# Patient Record
(demographics unavailable — no encounter records)

---

## 2024-11-14 NOTE — HISTORY OF PRESENT ILLNESS
[Post-hospitalization from ___ Hospital] : Post-hospitalization from [unfilled] Hospital [Admitted on: ___] : The patient was admitted on [unfilled] [Discharged on ___] : discharged on [unfilled] [FreeTextEntry2] : 65 F PMHx IDDM, HTN, CAD s/p CABG, CKD3, AF, hypothyroidism, GERD presents to establish care post [facility] discharge. Admitted after being unresponsive right after dinner with daughter. Hospital course complicated by hypoglycemia down to 40 within the first 24 hours of admission. Notably she was on GLP-1 and 55 units of longacting insulin with no prandial insulin. She was injecting in the same site chronically as well. This regimen was prescribed by her prior PCP with whom she did not have a strong relationship. vEEG negative, renal function improved, discharged with Dx of non-convulsive seizure. Thought to possibly by psychogenic in nature so started on SNRI for mood stabilization. No insulin since discharge. Last night 260 2-3 hours after dinner. Uses trulicity 0.75 once a week. Stopped metformin previously but willing to try it again. Gets some GI distress. b/l AKBAR chronic. Cardiologist -- [facility]

## 2024-11-14 NOTE — REVIEW OF SYSTEMS
[Chest Pain] : no chest pain [Palpitations] : no palpitations [Lower Ext Edema] : lower extremity edema [Negative] : Psychiatric

## 2024-11-14 NOTE — PHYSICAL EXAM
[No Acute Distress] : no acute distress [Normal Sclera/Conjunctiva] : normal sclera/conjunctiva [Normal Outer Ear/Nose] : the outer ears and nose were normal in appearance [No Respiratory Distress] : no respiratory distress  [Coordination Grossly Intact] : coordination grossly intact [Normal Gait] : normal gait [Normal Affect] : the affect was normal [de-identified] : 1+ b/l edema to shins

## 2024-11-14 NOTE — PHYSICAL EXAM
[No Acute Distress] : no acute distress [Normal Sclera/Conjunctiva] : normal sclera/conjunctiva [Normal Outer Ear/Nose] : the outer ears and nose were normal in appearance [No Respiratory Distress] : no respiratory distress  [Coordination Grossly Intact] : coordination grossly intact [Normal Gait] : normal gait [Normal Affect] : the affect was normal [de-identified] : 1+ b/l edema to shins

## 2024-11-14 NOTE — HISTORY OF PRESENT ILLNESS
[Post-hospitalization from ___ Hospital] : Post-hospitalization from [unfilled] Hospital [Admitted on: ___] : The patient was admitted on [unfilled] [Discharged on ___] : discharged on [unfilled] [FreeTextEntry2] : 65 F PMHx IDDM, HTN, CAD s/p CABG, CKD3, AF, hypothyroidism, GERD presents to establish care post [facility] discharge. Admitted after being unresponsive right after dinner with daughter. Hospital course complicated by hypoglycemia down to 40 within the first 24 hours of admission. Notably she was on GLP-1 and 55 units of longacting insulin with no prandial insulin. She was injecting in the same site chronically as well. This regimen was prescribed by her prior PCP with whom she did not have a strong relationship. vEEG negative, renal function improved, discharged with Dx of non-convulsive seizure. Thought to possibly by psychogenic in nature so started on SNRI for mood stabilization. No insulin since discharge. Last night 260 2-3 hours after dinner. Uses trulicity 0.75 once a week. Stopped metformin previously but willing to try it again. Gets some GI distress. b/l AKBAR chronic. Cardiologist -- [facility]

## 2024-12-02 NOTE — REVIEW OF SYSTEMS
[Nocturia] : nocturia [Frequency] : frequency [Dizziness] : dizziness [Unsteady Walking] : ataxia [Insomnia] : insomnia [Anxiety] : anxiety [Negative] : Cardiovascular [Fainting] : no fainting [Confusion] : no confusion

## 2024-12-02 NOTE — PHYSICAL EXAM
[No Acute Distress] : no acute distress [No Respiratory Distress] : no respiratory distress  [Normal Rate] : normal rate  [de-identified] : EOMI intact with no saccade. Able to stand with feet together and eyes closed with some swaying but self corrects. Declined single leg stand.

## 2024-12-02 NOTE — HISTORY OF PRESENT ILLNESS
[FreeTextEntry1] : DM follow-up [de-identified] : Arrived 25 minutes late so limited visit done. Dizzy episodes frequently the time with no identifiable triggers. At first she said she is dizzy all the time, then clarified to episodes of dizziness that happen multiple times per day. She feels stressed when she has them but isn't sure if they are triggered themselves by stress. Responds yes to lightheadedness, disequilibrium, and vertigo. Last happened this morning. Lasts about 5-10 minutes and goes away when she lies down. BS has been 100s to 120s. Right now is 156 and she says she's getting another attack. Associated with blurry vision as well. BP at home today 96/42 and she felt dizzy. Last night 96/58. 115/65 here. Drinking 1 gallon per day of water. Still having b/l AKBAR Will reduce amlodipine 10 to 5 then see in 2 weeks. If still dizzy will DC HCTZ vs increase venlafaxine. Sees endo tomorrow -- would be fine with DCing HCTZ and switch to SGLT2 if needed. Sees outside cardiologist on 12/10 Urinary frequency with 5 episodes of nocturia.

## 2024-12-05 NOTE — PHYSICAL EXAM
[Alert] : alert [No Acute Distress] : no acute distress [Normal Sclera/Conjunctiva] : normal sclera/conjunctiva [Normal Hearing] : hearing was normal [No Respiratory Distress] : no respiratory distress [No Accessory Muscle Use] : no accessory muscle use [Normal Rate and Effort] : normal respiratory rate and effort [Clear to Auscultation] : lungs were clear to auscultation bilaterally [Normal S1, S2] : normal S1 and S2 [Normal Rate] : heart rate was normal [Pedal Pulses Normal] : the pedal pulses are present [Normal Bowel Sounds] : normal bowel sounds [Not Tender] : non-tender [Not Distended] : not distended [Soft] : abdomen soft [Spine Straight] : spine straight [No Stigmata of Cushings Syndrome] : no stigmata of Cushings Syndrome [Right foot was examined, including] : right foot ~C was examined, including visual inspection with sensory and pulse exams [Left foot was examined, including] : left foot ~C was examined, including visual inspection with sensory and pulse exams [Normal] : normal [2+] : 2+ in the dorsalis pedis [Oriented x3] : oriented to person, place, and time [Normal Insight/Judgement] : insight and judgment were intact [Kyphosis] : no kyphosis present [Foot Ulcers] : no foot ulcers [de-identified] : Isthmus of thyroid feels enlarged on palpation [de-identified] : Mild LLE edema (L slightly >R at ankle) [de-identified] : Slowed gait, ambulates with walker [FreeTextEntry4] : monofilament testing intact [FreeTextEntry5] : Mild discoloration to Left foot [FreeTextEntry8] : decreased monofilament testing sensation to left foot

## 2024-12-05 NOTE — DATA REVIEWED
[FreeTextEntry1] : 11/14/24 serum creatinine 1.37, BUN 25, GFR 43 AST 58, ALT 90, Alk Phos 92 ACR 40  11/9/24 c-peptide 2.9 with glucose 107 Proinsulin 16.1 (0-10) serum creatinine 1.6, GFR 36  11/6/24 TSH 5.7, FT4 1.7, TT3 53, TT4 11.48 A1c 6.9%

## 2024-12-05 NOTE — ASSESSMENT
[FreeTextEntry1] : Type 2 DM, well controlled, complications including CKD, CAD  Current regimen:  Metformin ER 500mg daily, Trulicity 0.75mg weekly  Recently hospitalized for non-convulsive seizure and had episode of severe hypoglycemia while hospitalized (was taking Trulicity 0.75mg weekly and Long acting insulin 55 units daily prior to admission). Since stopping insulin after discharge, no hypoglycemia. She is tolerating Trulicity well. Does not like Metformin d/t size of tablets.  C-peptide 2.9 w/glucose 107 and elevated pro-insulin, likely d/t CKD -- will check fructosamine CGM report reveals generally very well controlled DM, FBS at goal of , PPBS w/minimal excursions. No hypos. Would like to keep her on GLP d/t history of CAD/CABG and she is tolerating well. Would not see great benefit on glucose of SGLT2 given recent GFR 43 but could have benefits for CKD.    Goal A1c <7%, A1c 6.8% Goal BP <130/80, BP today 107/67, takes olmesartan-amlodipine-HCTZ daily Goal LDL <100mg/dL, recent LDL 83, on rosuvastatin 40mg daily (above goal of <55 for pt's with CAD)  Plan -- Labs today, will call w/results -- Increase Trulicity to 1.5mg weekly (after finishing next 2 doses of Trulicity 0.75mg) -- Continue Metformin for next 2 weeks then stop when increasing dose of Trulicity -- Reviewed hypoglycemia protocol -- rule of 15. Will prescribe glucose tablets and glucagon -- Continue CGM use w/dexcom G7, indicated for severe hypoglycemia -- Reviewed and encouraged lifestyle modifications for good DM care including aiming for 150 min moderate exercise weekly and ADA healthy eating tips. -- encouraged to get out to senior [place] to promote activity and mental health/wellness -- Continue to follow up with podiatrist, ophthalmologist. -- Follow up with MD in 3 months  2. Hypothyroidism, diagnosed in 2024 Currently taking Levothyroxine 25mcg daily Has enlarged thyroid on exam (notably isthmus)  Plan -- TFTs today -- Thyroid US ordered -- Will call w/results -- Follow up with MD in 3 months

## 2024-12-05 NOTE — REVIEW OF SYSTEMS
[Fatigue] : fatigue [SOB on Exertion] : shortness of breath on exertion [Nocturia] : nocturia [Incontinence] : incontinence [As Noted in HPI] : as noted in HPI [Polydipsia] : polydipsia [Negative] : Integumentary [FreeTextEntry9] : left ankle pain [de-identified] : Intermittently dizzy, resolves with drinking water/laying down

## 2024-12-05 NOTE — ASSESSMENT
[FreeTextEntry1] : Type 2 DM, well controlled, complications including CKD, CAD  Current regimen:  Metformin ER 500mg daily, Trulicity 0.75mg weekly  Recently hospitalized for non-convulsive seizure and had episode of severe hypoglycemia while hospitalized (was taking Trulicity 0.75mg weekly and Long acting insulin 55 units daily prior to admission). Since stopping insulin after discharge, no hypoglycemia. She is tolerating Trulicity well. Does not like Metformin d/t size of tablets.  C-peptide 2.9 w/glucose 107 and elevated pro-insulin, likely d/t CKD -- will check fructosamine CGM report reveals generally very well controlled DM, FBS at goal of , PPBS w/minimal excursions. No hypos. Would like to keep her on GLP d/t history of CAD/CABG and she is tolerating well. Would not see great benefit on glucose of SGLT2 given recent GFR 43 but could have benefits for CKD.    Goal A1c <7%, A1c 6.8% Goal BP <130/80, BP today 107/67, takes olmesartan-amlodipine-HCTZ daily Goal LDL <100mg/dL, recent LDL 83, on rosuvastatin 40mg daily (above goal of <55 for pt's with CAD)  Plan -- Labs today, will call w/results -- Increase Trulicity to 1.5mg weekly (after finishing next 2 doses of Trulicity 0.75mg) -- Continue Metformin for next 2 weeks then stop when increasing dose of Trulicity -- Reviewed hypoglycemia protocol -- rule of 15. Will prescribe glucose tablets and glucagon -- Continue CGM use w/dexcom G7, indicated for severe hypoglycemia -- Reviewed and encouraged lifestyle modifications for good DM care including aiming for 150 min moderate exercise weekly and ADA healthy eating tips. -- encouraged to get out to senior [place] to promote activity and mental health/wellness -- Continue to follow up with podiatrist, ophthalmologist. -- Follow up with MD in 3 months  2. Hypothyroidism, diagnosed in 2024 Currently taking Levothyroxine 25mcg daily Has enlarged thyroid on exam (notably isthmus)  Plan -- TFTs today -- Thyroid US ordered -- Will call w/results -- Follow up with MD in 3 months

## 2024-12-05 NOTE — ADDENDUM
[FreeTextEntry1] : 12/5/24: Called Vonnie to discuss results of labs from 12/3/24. I had tried calling yesterday but she told me she was hospitalized with vomiting/dizziness yesterday and discharged late last night (was told this was an anxiety attack).  TSH 5.68, FT4 1.7, antibodies not significant. She is taking levothyroxine 25mcg on empty stomach, with only water, every day. Discussed confirmed she has no outstanding plans for cardiac procedures since CABG completed in Feb 2024, so will increase to 50mcg daily and repeat blood tests in 6-8 weeks (though advised to call office sooner than that based on how she feels with dose increase).  LDL 82, a bit above goal of <55 for patients with CAD (this may improve with improved thyroid function?), advised to continue statin. Fructosamine consistent with A1c 6.6%, similar to GMI on CGM. Will continue with DM plan as above.  Has follow up appt with Dr. Evans scheduled for 3/4/25

## 2024-12-05 NOTE — HISTORY OF PRESENT ILLNESS
[FreeTextEntry1] : Vonnie Espitia is a 65 year old female who presents to establish care for Diabetes Mellitus Type 2. Referred by:   PMHx: CAD (multiple vessel), atrial fibrillation, anemia, CKD, GERD, HTN, hypothyroidism PSHx: CABG x4 (2/20/24), PCI w/stent (2015), pacemaker placement (2/20/24) FMHx: DM (sisters, mother), CAD (sister) Allergies: Penicillin (rash)   Recently hospitalized at [facility] from 11/7/24 - 11/13/24 for vomiting/unresponsiveness after dinner with daughter (says she ate chicken and has GI intolerance to meat) Of note, had episode of hypoglycemia down to 40 within the first 24 hours of admission (was on GLP-1 and 55 units of longacting insulin at home) Diagnosed with non-convulsive seizure and discharged after renal function improved (started on venlafaxine as seizure thought to be psychogenic in nature) Has not taken insulin since discharge, denies hypoglycemia since stopping insulin  Feeling pretty sad/overwhelmed recently -- Her kids live in [place] so she doesn't see them often, spends most of her days alone, brother's anniversary pf death is coming up on 12/9, 2 best friends passed away recently, also has financial difficulties Uses OTC plus money from [employer] to buy food (next deposit is in January so she's running low on food/eating small portions lately). Does not skip medications to conserve money Feels thirsty often -- wakes up 5x overnight to void Has some dizziness and fatigue   Diagnosed with Diabetes at age 35 (1994) -- she had GDM with pregnancy which persisted. Has had 1 hospitalization for DM at age 50, (fainted and BG was 600)   POCT A1c in office: 6.8%  FS in office: 113 -- 2 boiled eggs and saltines and apple tea and a little cranberry juice at 8AM   Current Regimen: -- Metformin ER 500mg daily (some GI distress w/this but restarted after recent hospitalization -- tolerating better now but does not like size of pills) -- Trulicity 0.75mg weekly -- denies N/V   Previous regimens:  -- Long acting insulin 55 units daily  Vonnie checks BG with Dexcom G7 CGM Date of report download: 11/20/24 - 12/3/24 % time with CGM in use: 93%   Time high: 7%(time BG >251: 1%) Time in range: 93% Time low: 0% (time BG <54: 0%)   GMI: 6.3% Average glucose: 126mg/dL Glucose variability: 34mg/dL   CGM analysis reveals: Generally very well controlled glucose. FBS is at goal of , postprandial excursions generally minimal/short. No hypoglycemia   - Diet: tries to eat 3 meals/day Sleeps in daytime, wakes up around 1-2pm daily Breakfast is 3 eggs, toasted bread Lunch is leftovers (catfish with vegetables) Dinner depends on what she has around -- maybe eggs or vegetables or salmon - Exercise: Previously liked dancing, little exercise since CABG   Complications + neuropathy, CKD / albuminuria, CAD s/p CABG Denies retinopathy   Ophthalmologist: glaucoma B/L -- planning for operation when she is ready, last saw in October 2024 Podiatrist: January 2024   Hypothyroidism, diagnosed while hospitalized for CABG procedure in February 2024 Currently taking Levothyroxine 25mcg daily Last TFTs 11/6/24: TSH 5.7, FT4 1.7, TT3 53  Social Hx: Social alcohol use Denies nicotine, drug use Retired -- worked at [employer] for 35 yrs Lives alone Has 3 children, 2 daughters live in [place], son lives in [place]   Current medications: Metformin ER 500mg daily, Trulicity 0.75mg weekly, Eliquis 5mg BID, levothyroxine 25mcg daily, olmesartan-amlodipine-HCTZ 40-5-12.5mg daily, pantoprazole 40mg daily, rosuvastatin 40mg daily, venlafaxine 37.5mg daily, carvedilol 3.125mg BID

## 2024-12-05 NOTE — HISTORY OF PRESENT ILLNESS
[FreeTextEntry1] : Vonnie Espitia is a 65 year old female who presents to establish care for Diabetes Mellitus Type 2. Referred by:   PMHx: CAD (multiple vessel), atrial fibrillation, anemia, CKD, GERD, HTN, hypothyroidism PSHx: CABG x4 (2/20/24), PCI w/stent (2015), pacemaker placement (2/20/24) FMHx: DM (sisters, mother), CAD (sister) Allergies: Penicillin (rash)   Recently hospitalized at [facility] from 11/7/24 - 11/13/24 for vomiting/unresponsiveness after dinner with daughter (says she ate chicken and has GI intolerance to meat) Of note, had episode of hypoglycemia down to 40 within the first 24 hours of admission (was on GLP-1 and 55 units of longacting insulin at home) Diagnosed with non-convulsive seizure and discharged after renal function improved (started on venlafaxine as seizure thought to be psychogenic in nature) Has not taken insulin since discharge, denies hypoglycemia since stopping insulin  Feeling pretty sad/overwhelmed recently -- Her kids live in [place] so she doesn't see them often, spends most of her days alone, brother's anniversary pf death is coming up on 12/9, 2 best friends passed away recently, also has financial difficulties Uses OTC plus money from [employer] to buy food (next deposit is in January so she's running low on food/eating small portions lately). Does not skip medications to conserve money Feels thirsty often -- wakes up 5x overnight to void Has some dizziness and fatigue   Diagnosed with Diabetes at age 35 (1994) -- she had GDM with pregnancy which persisted. Has had 1 hospitalization for DM at age 50, (fainted and BG was 600)   POCT A1c in office: 6.8%  FS in office: 113 -- 2 boiled eggs and saltines and apple tea and a little cranberry juice at 8AM   Current Regimen: -- Metformin ER 500mg daily (some GI distress w/this but restarted after recent hospitalization -- tolerating better now but does not like size of pills) -- Trulicity 0.75mg weekly -- denies N/V   Previous regimens:  -- Long acting insulin 55 units daily  Vonnie checks BG with Dexcom G7 CGM Date of report download: 11/20/24 - 12/3/24 % time with CGM in use: 93%   Time high: 7%(time BG >251: 1%) Time in range: 93% Time low: 0% (time BG <54: 0%)   GMI: 6.3% Average glucose: 126mg/dL Glucose variability: 34mg/dL   CGM analysis reveals: Generally very well controlled glucose. FBS is at goal of , postprandial excursions generally minimal/short. No hypoglycemia   - Diet: tries to eat 3 meals/day Sleeps in daytime, wakes up around 1-2pm daily Breakfast is 3 eggs, toasted bread Lunch is leftovers (catfish with vegetables) Dinner depends on what she has around -- maybe eggs or vegetables or salmon - Exercise: Previously liked dancing, little exercise since CABG   Complications + neuropathy, CKD / albuminuria, CAD s/p CABG Denies retinopathy   Ophthalmologist: glaucoma B/L -- planning for operation when she is ready, last saw in October 2024 Podiatrist: January 2024   Hypothyroidism, diagnosed while hospitalized for CABG procedure in February 2024 Currently taking Levothyroxine 25mcg daily Last TFTs 11/6/24: TSH 5.7, FT4 1.7, TT3 53  Social Hx: Social alcohol use Denies nicotine, drug use Retired -- worked at [employer] for 35 yrs Lives alone Has 3 children, 2 daughters live in [place], son lives in [place]   Current medications: Metformin ER 500mg daily, Trulicity 0.75mg weekly, Eliquis 5mg BID, levothyroxine 25mcg daily, olmesartan-amlodipine-HCTZ 40-5-12.5mg daily, pantoprazole 40mg daily, rosuvastatin 40mg daily, venlafaxine 37.5mg daily, carvedilol 3.125mg BID

## 2024-12-05 NOTE — REVIEW OF SYSTEMS
[Fatigue] : fatigue [SOB on Exertion] : shortness of breath on exertion [Nocturia] : nocturia [Incontinence] : incontinence [As Noted in HPI] : as noted in HPI [Polydipsia] : polydipsia [Negative] : Integumentary [FreeTextEntry9] : left ankle pain [de-identified] : Intermittently dizzy, resolves with drinking water/laying down

## 2024-12-05 NOTE — PHYSICAL EXAM
[Alert] : alert [No Acute Distress] : no acute distress [Normal Sclera/Conjunctiva] : normal sclera/conjunctiva [Normal Hearing] : hearing was normal [No Respiratory Distress] : no respiratory distress [No Accessory Muscle Use] : no accessory muscle use [Normal Rate and Effort] : normal respiratory rate and effort [Clear to Auscultation] : lungs were clear to auscultation bilaterally [Normal S1, S2] : normal S1 and S2 [Normal Rate] : heart rate was normal [Pedal Pulses Normal] : the pedal pulses are present [Normal Bowel Sounds] : normal bowel sounds [Not Tender] : non-tender [Not Distended] : not distended [Soft] : abdomen soft [Spine Straight] : spine straight [No Stigmata of Cushings Syndrome] : no stigmata of Cushings Syndrome [Right foot was examined, including] : right foot ~C was examined, including visual inspection with sensory and pulse exams [Left foot was examined, including] : left foot ~C was examined, including visual inspection with sensory and pulse exams [Normal] : normal [2+] : 2+ in the dorsalis pedis [Oriented x3] : oriented to person, place, and time [Normal Insight/Judgement] : insight and judgment were intact [Kyphosis] : no kyphosis present [Foot Ulcers] : no foot ulcers [de-identified] : Isthmus of thyroid feels enlarged on palpation [de-identified] : Mild LLE edema (L slightly >R at ankle) [de-identified] : Slowed gait, ambulates with walker [FreeTextEntry4] : monofilament testing intact [FreeTextEntry5] : Mild discoloration to Left foot [FreeTextEntry8] : decreased monofilament testing sensation to left foot

## 2024-12-18 NOTE — HISTORY OF PRESENT ILLNESS
[FreeTextEntry1] : Vonnie is a 65 year old female with CAD s/p CABG (2/2024), hypothyroid, GERD, HTN, DM, Afib on Eliquis s/p PPM, TIA, and CKD presenting for a hospital follow up for seizures.   Presented to  ED on 11/7 for AMS. Stayed up late on 11/6 watching election coverage, and slept until 2PM the next day. When pt woke up, she was in her normal state of health. Later that night, went to a restaurant with her daughter. After eating, daughter stepped out of the restaurant to get the car. Pt then called her daughter complaining of rapid breathing, dizziness, nausea, and feeling like she was going to die. When daughter returned to the restaurant, pt was on a bench outside lying down. Bystanders reported pt had vomited.   Pt also reported 2 other events over the past 2 weeks waking up on the floor in her apartment completely unaware. Of note, also having difficulty with 3 recent deaths, including one of a very close friend two weeks prior.   In the ED, CTH, CTA, and CT perfusion all negative for stroke or structural abnormality. Found to be hypoglycemic and moderately anemic. vEEG showed benign variants (wicket spikes), but no epileptiform discharges. Determined pt had a provoked seizure triggered by emesis vs prodromal emesis with LOC and over 4 hours of post-ictal unawareness.   Started on Effexor 37.5mg daily.

## 2025-01-10 NOTE — PHYSICAL EXAM
[No Acute Distress] : no acute distress [Normal Sclera/Conjunctiva] : normal sclera/conjunctiva [Normal Outer Ear/Nose] : the outer ears and nose were normal in appearance [No JVD] : no jugular venous distention [No Respiratory Distress] : no respiratory distress  [Normal Affect] : the affect was normal [de-identified] : b/l prominent varicose veins. Borderline pitting b/l ankles. 1+ DP bilaterally. [de-identified] : unsteady gait, ambulates with hand on wall

## 2025-01-10 NOTE — REVIEW OF SYSTEMS
[Recent Change In Weight] : ~T no recent weight change [Chest Pain] : chest pain [Lower Ext Edema] : lower extremity edema [Joint Stiffness] : joint stiffness [Joint Swelling] : joint swelling [Headache] : headache

## 2025-01-10 NOTE — HISTORY OF PRESENT ILLNESS
[FreeTextEntry1] : General f/u [de-identified] : 65 F PMHx a fib, CAD, CKD, DM, painful AKBAR, GERD here for general f/u. Cardiologist started ranolazine 500 bid for chest pain which is helping somewhat. Still on amlodipine 5mg. Having worsening leg pain related to varicose veins most bothersome at an area on medial R leg where she had prior surgery many years ago. Taking Tylenol and drinking water but not helping. Tried lidocaine and other creams which also didn't help. Getting headaches possibly related to Tylenol overuse. Walker broke and needs new one. Having throat pain/discomfort and doesn't like the way her pacemaker feels. Managed at [facility] and pending f/u with her EP.

## 2025-02-19 NOTE — REVIEW OF SYSTEMS
[As Noted in HPI] : as noted in HPI [Limb Pain] : limb pain [Limb Swelling] : limb swelling [Negative] : Heme/Lymph [FreeTextEntry1] : as above

## 2025-02-19 NOTE — DATA REVIEWED
[FreeTextEntry1] : i reviewed b/l vle with rvt - b/l gsv and b/l lsv reflux, large branch varicose veins, no dvt, no svt

## 2025-02-19 NOTE — REASON FOR VISIT
[Initial Evaluation] : an initial evaluation [FreeTextEntry1] : pt with severe long standing CEAP 5 CVI with past hx open ulcers left medial ankle (CEAP 6) - has had procedures in the past elsewhere - referred now by Dr Murphy for assessment of severe b/l leg CVI with large varicose veins, severe medial ankle dermatitis (left > right) and sx of ache heaviness cramps swelling fatigue - b/l vle with rvt shows b/l gsv and b/l lsv reflux, no dvt, no svt - pt give Rx for thigh high support hose 20-30 mmHg and will return for b/l gsv and b/l lsv rfa and will need stab phlebectomy. rx given for thigh high support hose 20-30 mmHg.

## 2025-02-19 NOTE — HISTORY OF PRESENT ILLNESS
[FreeTextEntry1] : Pt has + screening on mental health questionnaire and will call to discuss with Dr Murphy re treatment - also provided her with [facility].

## 2025-02-19 NOTE — PHYSICAL EXAM
[2+] : left 2+ [Ankle Swelling (On Exam)] : present [Ankle Swelling On The Left] : moderate [Varicose Veins Of Lower Extremities] : bilaterally [] : bilaterally [Ankle Swelling Bilaterally] : severe [Purpura] : no purpura  [Petechiae] : no petechiae [Skin Ulcer] : no ulcer [Skin Induration] : induration [Alert] : alert [Oriented to Person] : oriented to person [Oriented to Place] : oriented to place [Oriented to Time] : oriented to time [Depressed] : depressed [de-identified] : wdwn nad [FreeTextEntry1] : large branch varicose veins (l>rt), indurated painful dermatitis left medial ankle > rt [de-identified] : wnl [de-identified] : as above

## 2025-02-19 NOTE — ASSESSMENT
[FreeTextEntry1] : Vascular surgeon discussed with the patient: Varicose veins are enlarged, twisted veins. Varicose veins are caused by increased blood pressure in the veins.  The blood moves towards the heart by 1-way valves in the veins. When the valves become weakened or damaged, blood can collect in the veins. This causes the veins to become enlarged. Sitting or standing for long periods can cause blood to pool in the leg veins, increasing the pressure within the veins. The veins can stretch from the increased pressure. This may weaken the walls of the veins and damage the valves. Varicose veins may be more common in some families (inherited).  Factors that may increase pressure include:  obesity, older age, being female, pregnancy, taking oral contraceptive pills or hormone replacement, being inactive, and/or smoking.  The most common symptoms of varicose veins are sensations in the legs, such as a heavy feeling, burning, and/or aching. However, each individual may experience symptoms differently.  Other symptoms may include:  color changes in the skin, sores on the legs, or rash.  Severe varicose veins may eventually produce long-term mild swelling that can result in more serious skin and tissue problems, such as ulcers and non-healing sores. Varicose veins are diagnosed by a complete medical history, physical examination, and diagnostic studies for varicose veins including duplex ultrasound and color-flow imaging.   Medical treatment for varicose veins include:  compression stockings, sclerotherapy, RFA,endovenous laser ablation and/or surgical treatment microphlebectomy.    Pt with severe b/l CVI  Treatment: b/l gsv and b/l lsv rfa stab phlebectomy Rx for thigh high support hose 20-30 mmHg [Other: _____] : [unfilled]

## 2025-03-04 NOTE — PHYSICAL EXAM
[Alert] : alert [Normal Sclera/Conjunctiva] : normal sclera/conjunctiva [No Neck Mass] : no neck mass was observed [Clear to Auscultation] : lungs were clear to auscultation bilaterally [Normal Rate] : heart rate was normal [Carotids Normal] : carotid pulses were normal with no bruits [Soft] : abdomen soft [No CVA Tenderness] : no ~M costovertebral angle tenderness [No Stigmata of Cushings Syndrome] : no stigmata of Cushings Syndrome [Normal Gait] : normal gait [No Rash] : no rash [Normal Reflexes] : deep tendon reflexes were 2+ and symmetric [Oriented x3] : oriented to person, place, and time

## 2025-03-04 NOTE — ASSESSMENT
[Carbohydrate Consistent Diet] : carbohydrate consistent diet [Importance of Diet and Exercise] : importance of diet and exercise to improve glycemic control, achieve weight loss and improve cardiovascular health [Diabetic Medications] : Risks and benefits of diabetic medications were discussed [FreeTextEntry1] : Type 2 DM, well controlled, complications including CKD, CAD  Current regimen:  Metformin ER 500mg daily, Trulicity 0.75mg weekly  Recently hospitalized for non-convulsive seizure and had episode of severe hypoglycemia while hospitalized (was taking Trulicity 0.75mg weekly and Long acting insulin 55 units daily prior to admission). Since stopping insulin after discharge, no hypoglycemia. She is tolerating Trulicity well. Does not like Metformin d/t size of tablets.  C-peptide 2.9 w/glucose 107 and elevated pro-insulin, likely d/t CKD -- will check fructosamine CGM report reveals generally very well controlled DM, FBS at goal of , PPBS w/minimal excursions. No hypos. Would like to keep her on GLP d/t history of CAD/CABG and she is tolerating well. Would not see great benefit on glucose of SGLT2 given recent GFR 43 but could have benefits for CKD.    Goal A1c <7%, A1c 6.8% Goal BP <130/80, BP today 107/67, takes olmesartan-amlodipine-HCTZ daily Goal LDL <100mg/dL, recent LDL 83, on rosuvastatin 40mg daily (above goal of <55 for pt's with CAD)  Plan -- Labs today, will call w/results -- Increase Trulicity to 1.5mg weekly (after finishing next 2 doses of Trulicity 0.75mg) -- Continue Metformin for next 2 weeks then stop when increasing dose of Trulicity -- Reviewed hypoglycemia protocol -- rule of 15. Will prescribe glucose tablets and glucagon -- Continue CGM use w/dexcom G7, indicated for severe hypoglycemia -- Reviewed and encouraged lifestyle modifications for good DM care including aiming for 150 min moderate exercise weekly and ADA healthy eating tips. -- encouraged to get out to senior [place] to promote activity and mental health/wellness -- Continue to follow up with podiatrist, ophthalmologist. -- Follow up with MD in 3 months  2. Hypothyroidism, diagnosed in 2024 Currently taking Levothyroxine 25mcg daily Has enlarged thyroid on exam (notably isthmus)  Plan -- TFTs today -- Thyroid US ordered -- Will call w/results -- Follow up with MD in 3 months

## 2025-03-04 NOTE — HISTORY OF PRESENT ILLNESS
[FreeTextEntry1] : Vonnie Espitia is a 65 year old female who presents to establish care for Diabetes Mellitus Type 2 diagnosed approximately at [facility] [address].  PMHx: CAD (multiple vessel), atrial fibrillation, anemia, CKD, GERD, HTN, hypothyroidism PSHx: CABG x4 (2/20/24), PCI w/stent (2015), pacemaker placement (2/20/24) FMHx: DM (sisters, mother), CAD (sister) Allergies: Penicillin (rash)   Recently hospitalized at [facility] from 11/7/24 - 11/13/24 for vomiting/unresponsiveness after dinner with daughter (says she ate chicken and has GI intolerance to meat) Of note, had episode of hypoglycemia down to 40 within the first 24 hours of admission (was on GLP-1 and 55 units of longacting insulin at home) Diagnosed with non-convulsive seizure and discharged after renal function improved (started on venlafaxine as seizure thought to be psychogenic in nature) Has not taken insulin since discharge, denies hypoglycemia since stopping insulin  Feeling pretty sad/overwhelmed recently -- Her kids live in [place] so she doesn't see them often, spends most of her days alone, brother's anniversary pf death is coming up on 12/9, 2 best friends passed away recently, also has financial difficulties Uses OTC plus money from [email or web address] to buy food (next deposit is in January so she's running low on food/eating small portions lately). Does not skip medications to conserve money Feels thirsty often -- wakes up 5x overnight to void Has some dizziness and fatigue  Diagnosed with Diabetes at age 35 (1994) -- she had GDM with pregnancy which persisted. Has had 1 hospitalization for DM at age 50, (fainted and BG was 600)  POCT A1c in office: 6.8%  FS in office: 113 -- 2 boiled eggs and saltines and apple tea and a little cranberry juice at 8AM  3/4/25, patient is feeling well no major complaints, she's a schedule for incompetent varicocity surgery. Glucose readings at home are in the 130 - 140 range. No osmotic diuresis symptoms Medicines: metformin 500 mg once today, trulicity  1.5 weekly injection, rosuvastatin 40 mg Eliquis 5 mg, and levothyroxin 25 g daily December 3, 2024, and A1c 6.7%, TSH 5.6    Current Regimen: -- Metformin ER 500mg daily (some GI distress w/this but restarted after recent hospitalization -- tolerating better now but does not like size of pills) -- Trulicity 0.75mg weekly -- denies N/V   Previous regimens:  -- Long acting insulin 55 units daily  Vonnie checks BG with Dexcom G7 CGM Date of report download: 11/20/24 - 12/3/24 % time with CGM in use: 93%   Time high: 7%(time BG >251: 1%) Time in range: 93% Time low: 0% (time BG <54: 0%)   GMI: 6.3% Average glucose: 126mg/dL Glucose variability: 34mg/dL   CGM analysis reveals: Generally very well controlled glucose. FBS is at goal of , postprandial excursions generally minimal/short. No hypoglycemia   - Diet: tries to eat 3 meals/day Sleeps in daytime, wakes up around 1-2pm daily Breakfast is 3 eggs, toasted bread Lunch is leftovers (catfish with vegetables) Dinner depends on what she has around -- maybe eggs or vegetables or salmon - Exercise: Previously liked dancing, little exercise since CABG   Complications + neuropathy, CKD / albuminuria, CAD s/p CABG Denies retinopathy   Ophthalmologist: glaucoma B/L -- planning for operation when she is ready, last saw in October 2024 Podiatrist: January 2024   Hypothyroidism, diagnosed while hospitalized for CABG procedure in February 2024 Currently taking Levothyroxine 25mcg daily Last TFTs 11/6/24: TSH 5.7, FT4 1.7, TT3 53  Social Hx: Social alcohol use Denies nicotine, drug use Retired -- worked at [employer] for 35 yrs Lives alone Has 3 children, 2 daughters live in [place], son lives in [place]   Current medications: Metformin ER 500mg daily, Trulicity 0.75mg weekly, Eliquis 5mg BID, levothyroxine 25mcg daily, olmesartan-amlodipine-HCTZ 40-5-12.5mg daily, pantoprazole 40mg daily, rosuvastatin 40mg daily, venlafaxine 37.5mg daily, carvedilol 3.125mg BID

## 2025-03-04 NOTE — REVIEW OF SYSTEMS
[Fatigue] : fatigue [SOB on Exertion] : shortness of breath on exertion [Nocturia] : nocturia [Incontinence] : incontinence [As Noted in HPI] : as noted in HPI [Polydipsia] : polydipsia [Negative] : Integumentary [FreeTextEntry9] : left ankle pain [de-identified] : Intermittently dizzy, resolves with drinking water/laying down

## 2025-03-28 NOTE — HISTORY OF PRESENT ILLNESS
[FreeTextEntry1] : General f/u [de-identified] : 65 F PMHx a fib, CAD, CKD, DM, painful AKBAR, GERD here for general f/u. Had a home visit from someone (maybe Nikhil from [facility]) but then she never able to establish with [facility]. She stopped paying for cell service due to financial strain, and did not realize that her healthcare providers need to be able to call her to coordinate care. Reviewed 3/4/25 labs -- Cr improving, UACR slight improvement,  Reports taking rosuvastatin 40mg ~5 days a week due to forgetfulness Has been getting diarrhea -- possibly related to metformin 500mg daily but she isn't sure Her EP told her to stop metoprolol, but I didn't know she was on it in the first place. No longer getting any help at home. Doesn't have phone anymore because she can't pay for her phone plan. Did not have a plan to get more home assistance.

## 2025-03-28 NOTE — PHYSICAL EXAM
[No Acute Distress] : no acute distress [Normal Sclera/Conjunctiva] : normal sclera/conjunctiva [Normal Outer Ear/Nose] : the outer ears and nose were normal in appearance [No Respiratory Distress] : no respiratory distress  [Normal Affect] : the affect was normal [de-identified] : stable with straight cane [de-identified] : poor insight

## 2025-04-16 NOTE — PROCEDURE
[FreeTextEntry1] : right gsv rfa [FreeTextEntry3] : right lower extremity varicose veins with inflammation, fatigue, heaviness, pain, swelling, cramping heaviness, and dermatitis.  Pt was diagnosed with Chronic Venous insufficiency (CVI), chronic venous hypertension and venous reflux which requires thermal ablation with endovenous radiofrequency to treat the truncal/axial vein reflux (Radiofrequency Ablation, RFA).  Ultrasound examination demonstrated reflux in the right gsv vein with reflux into the patient's varicosities.  A trial of compression stockings, exercise, elevation, and pain medication was attempted without relief and as such, this patient was offered RFA as definitive treatment.  After explaining risks and benefits, informed consent was obtained and the patient was brought to the treatment room.  The patient was escorted to the procedure room and placed in the supine position on the examination table.   The right  leg was prepped and draped in the usual sterile fashion and time-out was called.  A sonographic probe was placed into a sterile sheath and the lower extremity was imaged. A suitable access site in the superficial truncal vein to be treated was identified using sonographic guidance and marked at the skin level. 1 mL of 1% lidocaine without epinephrine was administered subcutaneously using a 25G needle.  The RF catheter, dilator and introducer were flushed with saline and wiped down and placed on the sterile field.  Using standard Seldinger technique, access to the saphenous vein was obtained with the needle and corresponding guidewire.  The needle was removed and the 7Fr introducer sheath with dilator was advanced over the wire into the vein. The guide wire and dilator were removed and the RFA fiber catheter was placed into the vein through the introducer sheath.  The position of the RFA  2 cm below the Sapheno-femoral Junction was verified using ultrasound guidance.       Local tumescent anesthesia, under ultrasound guidance, was administered circumferentially around the entire length of vein in the perivenous compartment to ensure a complete "halo" of anesthetic around the vein.  Tumescent anesthesia:  250 mL 0.9% Sodium Chloride was poured into a sterile blue basin and 50 cc of sterile injectable lidocaine 1% (without Epinephrine) was added using a 20 mL syringe.  The RFA location was again confirmed to be appropriate and the RFA energy was applied by pressing the hand button.  The proximal 7 cm was treated for 2 cycles at 20 seconds. The RFA fiber was then withdrawn in sequential 6.5 cm segments and each section was treated with one cycle of 20 seconds thermal energy application. The system was monitored to ensure that the vein wall temperature was within 120 +/- 5 degrees C and the generator output was well below its maximum starting power.   The operation of the RFA was ceased when the fiber end was at its most distal marker as indicated by the distal tip doe lines.  The sheath and RFA fiber catheter were removed together and manual pressure was applied at access site for hemostasis.  Post procedure, the vein was imaged and showed successful closure along the entire treated length with a patent proximal most 2-3 cm and sapheno-femoral junction.  Dry bandaid was applied to the access site and a compression stocking 20 - 30 mm Hg was applied to the treated extremity.  The patient tolerated the procedure well with no complications.  Vital signs stable, patient was monitored throughout procedure.    Refer to procedure sheet for procedure start and end time, # of total cycles,  total treated vein length treated,  and total treatment duration time documentation.   Post-Op Instructions:  The following instructions were reviewed with the patient and a print out of the instructions provided to patient at time of visit:   1)  Compression stocking to be worn 24 hours per day x 7 days.  2)  Do not get the stocking wet for the first 3 days.  3)  Ambulation immediately following procedure and as much as possible for the first 7 days.  4)  Contact the office if any questions or concerns. 5)  Patient must follow-up within the first week for post procedure reflux study performed in office.  Reviewed with the patient the follow up visit is to ensure that there are no complications such as a deep vein thrombosis (DVT) or endovenous heat-induced thrombus (EHIT).  Patient acknowledged understanding of post-op instructions and was provided with print out of instructions at time of visit.    Reviewed with the patient post-procedure RFA  instructions, provided patient with printed copy of instructions along with Dr. Pagan's cell phone number:  [phone], and advised patient to call for any questions or concerns prior to follow up visit.  All questions answered.

## 2025-04-16 NOTE — REASON FOR VISIT
[Procedure: _________] : a [unfilled] procedure visit [FreeTextEntry1] : Patient has severe reflux right gsv and here for right gsv RFA procedure.  DONAL COLON proceeded with right gsv RFA with no complications.  DONAL COLON will follow up in one week for routine visit with VLE.

## 2025-04-22 NOTE — PROCEDURE
[FreeTextEntry1] : right lsv rfa [FreeTextEntry3] : right lower extremity varicose veins with inflammation, fatigue, heaviness, pain, swelling, cramping heaviness, and dermatitis.  Pt was diagnosed with Chronic Venous insufficiency (CVI), chronic venous hypertension and venous reflux which requires thermal ablation with endovenous radiofrequency to treat the truncal/axial vein reflux (Radiofrequency Ablation, RFA).  Ultrasound examination demonstrated reflux in the right lsv vein with reflux into the patient's varicosities.  A trial of compression stockings, exercise, elevation, and pain medication was attempted without relief and as such, this patient was offered RFA as definitive treatment.  After explaining risks and benefits, informed consent was obtained and the patient was brought to the treatment room.  The patient was escorted to the procedure room and placed in the prone position on the examination table.   The right leg was prepped and draped in the usual sterile fashion and time-out was called.  A sonographic probe was placed into a sterile sheath and the lower extremity was imaged. A suitable access site in the superficial truncal vein to be treated was identified using sonographic guidance and marked at the skin level. 1 mL of 1% lidocaine without epinephrine was administered subcutaneously using a 25G needle.  The RF catheter, dilator and introducer were flushed with saline and wiped down and placed on the sterile field.  Using standard Seldinger technique, access to the saphenous vein was obtained with the needle and corresponding guidewire.  The needle was removed and the 7Fr introducer sheath with dilator was advanced over the wire into the vein. The guide wire and dilator were removed and the RFA fiber catheter was placed into the vein through the introducer sheath.  The position of the RFA  2 cm below the Sapheno-popliteal Junction was verified using ultrasound guidance.       Local tumescent anesthesia, under ultrasound guidance, was administered circumferentially around the entire length of vein in the perivenous compartment to ensure a complete "halo" of anesthetic around the vein.  Tumescent anesthesia:  250 mL 0.9% Sodium Chloride was poured into a sterile blue basin and 50 cc of sterile injectable lidocaine 1% (without Epinephrine) was added using a 20 mL syringe.  The RFA location was again confirmed to be appropriate and the RFA energy was applied by pressing the hand button.  The proximal 3 cm was treated for 2 cycles at 20 seconds. The RFA fiber was then withdrawn in sequential 3 cm segments and each section was treated with one cycle of 20 seconds thermal energy application. The system was monitored to ensure that the vein wall temperature was within 120 +/- 5 degrees C and the generator output was well below its maximum starting power.   The operation of the RFA was ceased when the fiber end was at its most distal marker as indicated by the distal tip doe lines.  The sheath and RFA fiber catheter were removed together and manual pressure was applied at access site for hemostasis.  Post procedure, the vein was imaged and showed successful closure along the entire treated length with a patent proximal most 2-3 cm and sapheno-popliteal junction.  Dry bandaid was applied to the access site and a compression stocking 20 - 30 mm Hg was applied to the treated extremity.  The patient tolerated the procedure well with no complications.  Vital signs stable, patient was monitored throughout procedure.    Refer to procedure sheet for procedure start and end time, # of total cycles,  total treated vein length treated,  and total treatment duration time documentation.   Post-Op Instructions:  The following instructions were reviewed with the patient and a print out of the instructions provided to patient at time of visit:   1)  Compression stocking to be worn 24 hours per day x 7 days.  2)  Do not get the stocking wet for the first 3 days.  3)  Ambulation immediately following procedure and as much as possible for the first 7 days.  4)  Contact the office if any questions or concerns. 5)  Patient must follow-up within the first week for post procedure reflux study performed in office.  Reviewed with the patient the follow up visit is to ensure that there are no complications such as a deep vein thrombosis (DVT) or endovenous heat-induced thrombus (EHIT).  Patient acknowledged understanding of post-op instructions and was provided with print out of instructions at time of visit.    Reviewed with the patient post-procedure RFA  instructions, provided patient with printed copy of instructions along with Dr. Pagan's cell phone number:  [phone], and advised patient to call for any questions or concerns prior to follow up visit.  All questions answered.

## 2025-04-22 NOTE — REASON FOR VISIT
[Procedure: _________] : a [unfilled] procedure visit [FreeTextEntry1] : Patient has severe reflux right lsv and here for right lsv RFA procedure.  DONAL COLON proceeded with right lsv RFA with no complications.  DONAL COLON will follow up in one week for routine visit with VLE. Pt also underwent rt gsv rfa with rvt s/p recent rt gsv rfa - rt gsv closed, no dvt, no ehit, rt sf junction patent and comrpessible.

## 2025-04-24 NOTE — PROCEDURE
[FreeTextEntry1] : left gsv rfa [FreeTextEntry3] : left lower extremity varicose veins with inflammation, fatigue, heaviness, pain, swelling, cramping heaviness, and dermatitis.  Pt was diagnosed with Chronic Venous insufficiency (CVI), chronic venous hypertension and venous reflux which requires thermal ablation with endovenous radiofrequency to treat the truncal/axial vein reflux (Radiofrequency Ablation, RFA).  Ultrasound examination demonstrated reflux in the left gsv vein with reflux into the patient's varicosities.  A trial of compression stockings, exercise, elevation, and pain medication was attempted without relief and as such, this patient was offered RFA as definitive treatment.  After explaining risks and benefits, informed consent was obtained and the patient was brought to the treatment room.  The patient was escorted to the procedure room and placed in the supine position on the examination table.   The left leg was prepped and draped in the usual sterile fashion and time-out was called.  A sonographic probe was placed into a sterile sheath and the lower extremity was imaged. A suitable access site in the superficial truncal vein to be treated was identified using sonographic guidance and marked at the skin level. 1 mL of 1% lidocaine without epinephrine was administered subcutaneously using a 25G needle.  The RF catheter, dilator and introducer were flushed with saline and wiped down and placed on the sterile field.  Using standard Seldinger technique, access to the saphenous vein was obtained with the needle and corresponding guidewire.  The needle was removed and the 7Fr introducer sheath with dilator was advanced over the wire into the vein. The guide wire and dilator were removed and the RFA fiber catheter was placed into the vein through the introducer sheath.  The position of the RFA  2 cm below the Sapheno-femoral Junction was verified using ultrasound guidance.       Local tumescent anesthesia, under ultrasound guidance, was administered circumferentially around the entire length of vein in the perivenous compartment to ensure a complete "halo" of anesthetic around the vein.  Tumescent anesthesia:  250 mL 0.9% Sodium Chloride was poured into a sterile blue basin and 50 cc of sterile injectable lidocaine 1% (without Epinephrine) was added using a 20 mL syringe.  The RFA location was again confirmed to be appropriate and the RFA energy was applied by pressing the hand button.  The proximal 7 cm was treated for 2 cycles at 20 seconds. The RFA fiber was then withdrawn in sequential 6.5 cm segments and each section was treated with one cycle of 20 seconds thermal energy application. The system was monitored to ensure that the vein wall temperature was within 120 +/- 5 degrees C and the generator output was well below its maximum starting power.   The operation of the RFA was ceased when the fiber end was at its most distal marker as indicated by the distal tip doe lines.  The sheath and RFA fiber catheter were removed together and manual pressure was applied at access site for hemostasis.  Post procedure, the vein was imaged and showed successful closure along the entire treated length with a patent proximal most 2-3 cm and sapheno-femoral junction.  Dry bandaid was applied to the access site and a compression stocking 20 - 30 mm Hg was applied to the treated extremity.  The patient tolerated the procedure well with no complications.  Vital signs stable, patient was monitored throughout procedure.    Refer to procedure sheet for procedure start and end time, # of total cycles,  total treated vein length treated,  and total treatment duration time documentation.   Post-Op Instructions:  The following instructions were reviewed with the patient and a print out of the instructions provided to patient at time of visit:   1)  Compression stocking to be worn 24 hours per day x 7 days.  2)  Do not get the stocking wet for the first 3 days.  3)  Ambulation immediately following procedure and as much as possible for the first 7 days.  4)  Contact the office if any questions or concerns. 5)  Patient must follow-up within the first week for post procedure reflux study performed in office.  Reviewed with the patient the follow up visit is to ensure that there are no complications such as a deep vein thrombosis (DVT) or endovenous heat-induced thrombus (EHIT).  Patient acknowledged understanding of post-op instructions and was provided with print out of instructions at time of visit.    Reviewed with the patient post-procedure RFA  instructions, provided patient with printed copy of instructions along with Dr. Pagan's cell phone number:  [phone], and advised patient to call for any questions or concerns prior to follow up visit.  All questions answered.

## 2025-04-24 NOTE — REASON FOR VISIT
[Procedure: _________] : a [unfilled] procedure visit [FreeTextEntry1] : Patient has severe reflux left gsv and here for left gsv RFA procedure.  DONAL COLON proceeded with left gsv RFA with no complications.  DONAL COLON will follow up in one week for routine visit with VLE. Pt also underwent rt lsv followup vle s/p recent rfa - rt lsv closed, no dvt, no ehit, rt sp junction patent and compressible.

## 2025-05-01 NOTE — DATA REVIEWED
[FreeTextEntry1] : dr castro performed left gsv vle s/p recent left gsv rfa - left gsv open with remaining reflux and very large varicose veins, no dvt, no svt, left sf junction patent and compressible

## 2025-05-01 NOTE — REASON FOR VISIT
[Follow-Up: _____] : a [unfilled] follow-up visit [FreeTextEntry1] : She is s/p left gsv RFA.  Patient is here today for routine follow up with VLE. Left leg ultrasound confirms no dvt, no HIIT, large left gsv remains open and will need another attempt at thermal ablation  to close.

## 2025-05-01 NOTE — PHYSICAL EXAM
[2+] : left 2+ [Ankle Swelling (On Exam)] : present [Ankle Swelling On The Left] : moderate [Varicose Veins Of Lower Extremities] : bilaterally [] : bilaterally [Ankle Swelling Bilaterally] : severe [Purpura] : no purpura  [Petechiae] : no petechiae [Skin Ulcer] : no ulcer [Skin Induration] : induration [Alert] : alert [Oriented to Person] : oriented to person [Oriented to Place] : oriented to place [Oriented to Time] : oriented to time [Depressed] : depressed [de-identified] : wdwn nad [FreeTextEntry1] : large branch varicose veins (l>rt), indurated painful dermatitis left medial ankle > rt [de-identified] : wnl [de-identified] : as above

## 2025-06-13 NOTE — HISTORY OF PRESENT ILLNESS
[FreeTextEntry1] : follow-up [de-identified] : 66 F PMHx a fib, CAD, CKD, T2DM, Chronic AKBAR with varicose veins s/p failed RFA, GERD, labile mood here for general follow-up. Has had 2R 1L vascular procedures for varicose veins. She isn't happy with results. Her cardiologist oftered to attempt an ablation on July 18th. Difficulty sleeping and complaining of nocturia. Complaining of global itching which keeps her up. Feels she should be losing weight given how little she's eating -- egg and tortilla, banana and cracker. Water and coffee. States she rarely eats more than that but hasn't lost weight. Feeling depressed but doesn't feel like talking to her therapist. Ran out of venlafaxine. She thinks she still has levothyroxine.

## 2025-06-13 NOTE — REVIEW OF SYSTEMS
[Nocturia] : nocturia [Frequency] : frequency [Unsteady Walking] : ataxia [Insomnia] : insomnia [Depression] : depression [Negative] : Respiratory

## 2025-07-15 NOTE — PHYSICAL EXAM
[No Acute Distress] : no acute distress [Normal Sclera/Conjunctiva] : normal sclera/conjunctiva [Normal Outer Ear/Nose] : the outer ears and nose were normal in appearance [No Respiratory Distress] : no respiratory distress  [No Focal Deficits] : no focal deficits [Normal Affect] : the affect was normal [de-identified] : no pitting. Unchanged varicose veins, L ankle slightly bigger than R with nonpitting edema diffusely

## 2025-07-15 NOTE — HISTORY OF PRESENT ILLNESS
[FreeTextEntry1] : follow-up [de-identified] : 66 F PMHx a fib, CAD, CKD, T2DM, Chronic AKBAR with varicose veins s/p failed RFA, GERD, labile mood here for general follow-up. Today she wanted to discuss itching in back and head.  Insomnia -- I sent hydroxyzine 25mg in June which she took and it helped somewhat.  Dysphagia -- feels like she's going to choke when eating and has globus sensation. Has occurred for the past 2 weeks. This happened once before but went away on its own when she reduced eating at night. Never had EGD. Tums helps but she needs to use several of them. Taking pantoprazole only once or twice a week.  Anemia -- Hgb 9.7 on 6/23/25 down from 10.8 in 11/14/24. History of hemorrhoids identified in prior colonoscopy outside . She reports this was done in 2024 but isn't sure.

## 2025-07-15 NOTE — REVIEW OF SYSTEMS
[Lower Ext Edema] : lower extremity edema [Abdominal Pain] : abdominal pain [Heartburn] : heartburn [Melena] : no melena [Hematuria] : no hematuria [Itching] : Itching [Insomnia] : insomnia [Negative] : Constitutional